# Patient Record
Sex: MALE | Race: WHITE | NOT HISPANIC OR LATINO | URBAN - METROPOLITAN AREA
[De-identification: names, ages, dates, MRNs, and addresses within clinical notes are randomized per-mention and may not be internally consistent; named-entity substitution may affect disease eponyms.]

---

## 2017-11-02 ENCOUNTER — OFFICE VISIT - HEALTHEAST (OUTPATIENT)
Dept: INTERNAL MEDICINE | Facility: CLINIC | Age: 52
End: 2017-11-02

## 2017-11-02 ENCOUNTER — COMMUNICATION - HEALTHEAST (OUTPATIENT)
Dept: TELEHEALTH | Facility: CLINIC | Age: 52
End: 2017-11-02

## 2017-11-02 DIAGNOSIS — F41.9 ANXIETY: ICD-10-CM

## 2017-11-02 DIAGNOSIS — Z12.5 PROSTATE CANCER SCREENING: ICD-10-CM

## 2017-11-02 DIAGNOSIS — Z00.00 ANNUAL PHYSICAL EXAM: ICD-10-CM

## 2017-11-02 DIAGNOSIS — Z13.220 LIPID SCREENING: ICD-10-CM

## 2017-11-02 DIAGNOSIS — I10 ESSENTIAL HYPERTENSION: ICD-10-CM

## 2017-11-02 DIAGNOSIS — Z12.11 COLON CANCER SCREENING: ICD-10-CM

## 2017-11-02 DIAGNOSIS — F32.A DEPRESSION: ICD-10-CM

## 2017-11-02 DIAGNOSIS — R53.83 FATIGUE: ICD-10-CM

## 2017-11-02 LAB
CHOLEST SERPL-MCNC: 285 MG/DL
FASTING STATUS PATIENT QL REPORTED: YES
HDLC SERPL-MCNC: 64 MG/DL
LDLC SERPL CALC-MCNC: 183 MG/DL
PSA SERPL-MCNC: 2.4 NG/ML (ref 0–3.5)
TRIGL SERPL-MCNC: 188 MG/DL

## 2017-11-02 ASSESSMENT — MIFFLIN-ST. JEOR: SCORE: 1491.92

## 2017-11-05 ENCOUNTER — COMMUNICATION - HEALTHEAST (OUTPATIENT)
Dept: INTERNAL MEDICINE | Facility: CLINIC | Age: 52
End: 2017-11-05

## 2017-11-14 ENCOUNTER — RECORDS - HEALTHEAST (OUTPATIENT)
Dept: ADMINISTRATIVE | Facility: OTHER | Age: 52
End: 2017-11-14

## 2017-11-19 ENCOUNTER — RECORDS - HEALTHEAST (OUTPATIENT)
Dept: ADMINISTRATIVE | Facility: OTHER | Age: 52
End: 2017-11-19

## 2019-04-15 ENCOUNTER — COMMUNICATION - HEALTHEAST (OUTPATIENT)
Dept: INTERNAL MEDICINE | Facility: CLINIC | Age: 54
End: 2019-04-15

## 2019-05-09 ENCOUNTER — COMMUNICATION - HEALTHEAST (OUTPATIENT)
Dept: INTERNAL MEDICINE | Facility: CLINIC | Age: 54
End: 2019-05-09

## 2019-05-16 ENCOUNTER — OFFICE VISIT - HEALTHEAST (OUTPATIENT)
Dept: INTERNAL MEDICINE | Facility: CLINIC | Age: 54
End: 2019-05-16

## 2019-05-16 DIAGNOSIS — F33.1 MODERATE EPISODE OF RECURRENT MAJOR DEPRESSIVE DISORDER (H): ICD-10-CM

## 2019-05-16 DIAGNOSIS — Z00.00 ANNUAL PHYSICAL EXAM: ICD-10-CM

## 2019-05-16 DIAGNOSIS — E78.5 HYPERLIPIDEMIA LDL GOAL <130: ICD-10-CM

## 2019-05-16 DIAGNOSIS — I10 ESSENTIAL HYPERTENSION: ICD-10-CM

## 2019-05-16 DIAGNOSIS — R79.89 ABNORMAL LFTS: ICD-10-CM

## 2019-05-16 DIAGNOSIS — F41.9 ANXIETY: ICD-10-CM

## 2019-05-16 DIAGNOSIS — Z12.5 SCREENING FOR PROSTATE CANCER: ICD-10-CM

## 2019-05-16 LAB
ALBUMIN SERPL-MCNC: 4.7 G/DL (ref 3.5–5)
ALP SERPL-CCNC: 80 U/L (ref 45–120)
ALT SERPL W P-5'-P-CCNC: 266 U/L (ref 0–45)
ANION GAP SERPL CALCULATED.3IONS-SCNC: 15 MMOL/L (ref 5–18)
AST SERPL W P-5'-P-CCNC: 241 U/L (ref 0–40)
BASOPHILS # BLD AUTO: 0.1 THOU/UL (ref 0–0.2)
BASOPHILS NFR BLD AUTO: 1 % (ref 0–2)
BILIRUB SERPL-MCNC: 1.7 MG/DL (ref 0–1)
BUN SERPL-MCNC: 13 MG/DL (ref 8–22)
CALCIUM SERPL-MCNC: 10.9 MG/DL (ref 8.5–10.5)
CHLORIDE BLD-SCNC: 100 MMOL/L (ref 98–107)
CO2 SERPL-SCNC: 24 MMOL/L (ref 22–31)
CREAT SERPL-MCNC: 1.01 MG/DL (ref 0.7–1.3)
EOSINOPHIL # BLD AUTO: 0.2 THOU/UL (ref 0–0.4)
EOSINOPHIL NFR BLD AUTO: 3 % (ref 0–6)
ERYTHROCYTE [DISTWIDTH] IN BLOOD BY AUTOMATED COUNT: 11.1 % (ref 11–14.5)
GFR SERPL CREATININE-BSD FRML MDRD: >60 ML/MIN/1.73M2
GLUCOSE BLD-MCNC: 85 MG/DL (ref 70–125)
HCT VFR BLD AUTO: 48.7 % (ref 40–54)
HGB BLD-MCNC: 16.8 G/DL (ref 14–18)
LDLC SERPL CALC-MCNC: 179 MG/DL
LYMPHOCYTES # BLD AUTO: 0.9 THOU/UL (ref 0.8–4.4)
LYMPHOCYTES NFR BLD AUTO: 14 % (ref 20–40)
MCH RBC QN AUTO: 34.8 PG (ref 27–34)
MCHC RBC AUTO-ENTMCNC: 34.4 G/DL (ref 32–36)
MCV RBC AUTO: 101 FL (ref 80–100)
MONOCYTES # BLD AUTO: 0.7 THOU/UL (ref 0–0.9)
MONOCYTES NFR BLD AUTO: 12 % (ref 2–10)
NEUTROPHILS # BLD AUTO: 4.1 THOU/UL (ref 2–7.7)
NEUTROPHILS NFR BLD AUTO: 70 % (ref 50–70)
PLATELET # BLD AUTO: 222 THOU/UL (ref 140–440)
PMV BLD AUTO: 7.8 FL (ref 7–10)
POTASSIUM BLD-SCNC: 4.8 MMOL/L (ref 3.5–5)
PROT SERPL-MCNC: 7.9 G/DL (ref 6–8)
PSA SERPL-MCNC: 3.1 NG/ML (ref 0–3.5)
RBC # BLD AUTO: 4.82 MILL/UL (ref 4.4–6.2)
SODIUM SERPL-SCNC: 139 MMOL/L (ref 136–145)
TSH SERPL DL<=0.005 MIU/L-ACNC: 2.24 UIU/ML (ref 0.3–5)
WBC: 5.9 THOU/UL (ref 4–11)

## 2019-05-16 ASSESSMENT — MIFFLIN-ST. JEOR: SCORE: 1492.37

## 2019-05-17 ENCOUNTER — COMMUNICATION - HEALTHEAST (OUTPATIENT)
Dept: INTERNAL MEDICINE | Facility: CLINIC | Age: 54
End: 2019-05-17

## 2019-05-17 DIAGNOSIS — R79.89 ELEVATED LFTS: ICD-10-CM

## 2019-05-17 LAB
HBV SURFACE AG SERPL QL IA: NEGATIVE
HCV AB SERPL QL IA: NEGATIVE

## 2019-07-10 ENCOUNTER — COMMUNICATION - HEALTHEAST (OUTPATIENT)
Dept: INTERNAL MEDICINE | Facility: CLINIC | Age: 54
End: 2019-07-10

## 2019-08-08 ENCOUNTER — COMMUNICATION - HEALTHEAST (OUTPATIENT)
Dept: INTERNAL MEDICINE | Facility: CLINIC | Age: 54
End: 2019-08-08

## 2019-08-19 ENCOUNTER — COMMUNICATION - HEALTHEAST (OUTPATIENT)
Dept: INTERNAL MEDICINE | Facility: CLINIC | Age: 54
End: 2019-08-19

## 2019-10-14 ENCOUNTER — COMMUNICATION - HEALTHEAST (OUTPATIENT)
Dept: INTERNAL MEDICINE | Facility: CLINIC | Age: 54
End: 2019-10-14

## 2019-10-14 DIAGNOSIS — F33.1 MODERATE EPISODE OF RECURRENT MAJOR DEPRESSIVE DISORDER (H): ICD-10-CM

## 2019-10-14 DIAGNOSIS — F41.9 ANXIETY: ICD-10-CM

## 2020-01-03 ENCOUNTER — COMMUNICATION - HEALTHEAST (OUTPATIENT)
Dept: INTERNAL MEDICINE | Facility: CLINIC | Age: 55
End: 2020-01-03

## 2020-01-03 DIAGNOSIS — F33.1 MODERATE EPISODE OF RECURRENT MAJOR DEPRESSIVE DISORDER (H): ICD-10-CM

## 2020-01-03 DIAGNOSIS — F41.9 ANXIETY: ICD-10-CM

## 2020-03-27 ENCOUNTER — COMMUNICATION - HEALTHEAST (OUTPATIENT)
Dept: INTERNAL MEDICINE | Facility: CLINIC | Age: 55
End: 2020-03-27

## 2021-05-28 NOTE — TELEPHONE ENCOUNTER
Please let pt know, I sent important My Chart message regarding his results.  If he can not get in his My Chart , please let him know:    Blood work is good except for liver function tests: they are much higher then last year. I'm afraid that alcohol is affecting your liver causing inflammation (alcoholic hepatitis).  Viral hepatitis screen for hep B and C is negative.    Please stop all alcohol ( if drinking more the 3 shots a day, taper gradualy down over the next week to prevent withdrowal) and have repeat blood work done in 1 month (I put order in). If it is still abnormal, we will need to image your liver.

## 2021-05-28 NOTE — PROGRESS NOTES
[facility] Follow Up Note    Assessment/Plan:    1. Annual physical exam  Patient is not fasting today but will do nonfasting blood work.  She does have history of serrated adenoma and will be due for repeat colonoscopy next year.  No change in bowel habits.  Will check PSA today.    2. Anxiety  Zoloft caused too much diarrhea.  Will try Lexapro.  If he does not like that we can move on to Prozac.  Currently I want to avoid Wellbutrin due to moderate alcohol use.  - escitalopram oxalate (LEXAPRO) 10 MG tablet; Take 1/2 tab by mouth daily for 1 week, then full tab daily  Dispense: 30 tablet; Refill: 4  - Ambulatory referral to Psychology  - [facility](CBC and Differential)  - Thyroid Stimulating Hormone (TSH)  - 1 (CBC with Diff)    3. Moderate episode of recurrent major depressive disorder (H)  See #2  - escitalopram oxalate (LEXAPRO) 10 MG tablet; Take 1/2 tab by mouth daily for 1 week, then full tab daily  Dispense: 30 tablet; Refill: 4  - Ambulatory referral to Psychology  - [facility](CBC and Differential)  - Thyroid Stimulating Hormone (TSH)  - [facility] (CBC with Diff)    4. Essential hypertension  Pressures on the high side, most likely directly related to his alcohol consumption and anxiety.  Will reassess it again in 6 weeks when he follows up.  If it still high would put him on antihypertensive.  - Comprehensive Metabolic Panel    5. Hyperlipidemia LDL goal <130  LDL was 183 2 years ago.  We will repeat it again today.  At that time I did not start him on cholesterol medication due to liver function elevation, most likely secondary to alcohol.  He is amiable to have hepatitis checked  - Comprehensive Metabolic Panel  - LDL Cholesterol, Direct  - Thyroid Stimulating Hormone (TSH)    6. Abnormal LFTs  - Hepatitis C Antibody (Anti-HCV)  - Hepatitis B Surface Antigen (HBsAG)    7. Screening for prostate cancer  - PSA (Prostatic-Specific Antigen), Annual Screen      Abby Arenas MD    Chief Complaint:  Chief  Complaint   Patient presents with     Annual Exam       History of Present Illness:    Dudley is a 53 y.o. male with history of anxiety, depression, hyperlipidemia and elevated blood pressure.  He is currently here for an annual physical exam.    I last saw him 2 years ago.  At that time we started him on Zoloft for depression and anxiety but it caused too much diarrhea and he stopped it.  He felt to follow-up at that time.  Currently he continues to have moderate depression and stress which partly situational: His son is in intermediate, he has IRS trouble.  He reports waking up anxious and cold sweats.  She does use alcohol later in the day to help calm down.  She usually drinks vodka.  He denies getting drunk on it but did not say exactly how many shots he drinks in the day.  Currently he is open to try different antidepressant and will try Lexapro.  If that does not work then we can do Prozac.  We discussed referral to psychology and he will check if insurance covers it.    She also continues to have elevated blood pressure and also was noted to have LDL of 183 in 2017.  At that time his liver function tests were also elevated most likely due to out of alcohol.  Discussed that cutting back on alcohol can improve blood pressure and liver function tests and one that normalizes I would put him on a cholesterol medication.  On follow-up of his anxiety is better control and blood pressure is still high I would consider putting him on a antihypertensive as well.    Review of Systems:  A comprehensive review of systems was performed and was otherwise negative    PFSH:  Social History: Viewed  Social History     Tobacco Use   Smoking Status Never Smoker   Smokeless Tobacco Former User     Social History     Social History Narrative     from him wife. Has 2 sons. Lives by himself in an apartment.       Past History: Reviewed  Current Outpatient Medications   Medication Sig Dispense Refill     escitalopram oxalate  "(LEXAPRO) 10 MG tablet Take 1/2 tab by mouth daily for 1 week, then full tab daily 30 tablet 4     No current facility-administered medications for this visit.        Family History: Reviewed    Physical Exam:    Vitals:    05/16/19 1218 05/16/19 1219   BP: 164/82 142/80   Patient Site: Left Arm Left Arm   Patient Position: Sitting Sitting   Cuff Size: Adult Regular Adult Regular   Pulse: 88    SpO2: 98%    Weight: 158 lb (71.7 kg)    Height: 5' 6.5\" (1.689 m)      Wt Readings from Last 3 Encounters:   05/16/19 158 lb (71.7 kg)   11/02/17 157 lb 14.4 oz (71.6 kg)     Body mass index is 25.12 kg/m .    Constitutional:  Reveals a pleasant male.  Vitals:  Per nursing notes.  HEENT:No cervical LAD, no thyromegaly,  conjunctiva is pink, no scleral icterus, TMs are visualized and normal bl, oropharynx is clear, no exudates,   Cardiac:  Regular rate and rhythm,no murmurs, rubs, or gallops. Carotids without bruits. Legs without edema. Palpation of the radial pulse regular.  Lungs: Clear to auscultation bl.  Respiratory effort normal.  Abdomen:positive BS, soft, nontender, nondistended.  No hepato-splenomagaly  Skin:   Without rash, bruise, or palpable lesions.  Rheumatologic: Normal joints and nails of the hands.  Neurologic:  Cranial nerves II-XII intact.     Psychiatric: affect somewhat down, memory intact.   Rectal exam: Small external hemorrhoids, prostate is smooth enlarged enlarged, nontender    Data Review:    Analysis and Summary of Old Records (2): yes      Records Requested (1): no      Other History Summarized (from other people in the room) (2): no    Radiology Tests Summarized (XRAY/CT/MRI/DXA) (1): no    Labs Reviewed (1): yes    Medicine Tests Reviewed (EKG/ECHO/COLONOSCOPY/EGD) (1): no    Independent Review of EKG or X-RAY (2): no      "

## 2021-05-28 NOTE — PATIENT INSTRUCTIONS - HE
1. Start Lexapro, let me know if do not like it    2. Cut back on alcohol    3. Consider seeing psychologist (referrla placed)    4. B/p and cholesterol were elevated. Please cut down on alcohol will re-check on follow up.

## 2021-05-31 VITALS — HEIGHT: 67 IN | BODY MASS INDEX: 24.78 KG/M2 | WEIGHT: 157.9 LBS

## 2021-06-02 NOTE — TELEPHONE ENCOUNTER
Refill Given    Refill given per Policy, patient informed they are overdue for Office Visit    Ruby Abad, [facility] Connection Triage/Med Refill 10/16/2019    Requested Prescriptions   Pending Prescriptions Disp Refills     escitalopram oxalate (LEXAPRO) 10 MG tablet [Pharmacy Med Name: ESCITALOPRAM 10MG TABLET** 10 TAB] 30 tablet 4     Sig: TAKE 1/2 TABLET BY MOUTH DAILY FOR 1 WEEK, THEN TAKE 1 TABLET BY MOUTH DAILY       [facility] Refill Protocol  Passed - 10/14/2019  4:42 PM        Passed - PCP or prescribing provider visit in last year     Last office visit with prescriber/PCP: Visit date not found OR same dept: Visit date not found OR same specialty: Visit date not found  Last physical: 5/16/2019 Last MTM visit: Visit date not found   Next visit within 3 mo: Visit date not found  Next physical within 3 mo: Visit date not found  Prescriber OR PCP: Abby Arenas MD  Last diagnosis associated with med order: 1. Anxiety  - escitalopram oxalate (LEXAPRO) 10 MG tablet [Pharmacy Med Name: ESCITALOPRAM 10MG TABLET** 10 TAB]; TAKE 1/2 TABLET BY MOUTH DAILY FOR 1 WEEK, THEN TAKE 1 TABLET BY MOUTH DAILY  Dispense: 30 tablet; Refill: 4    2. Moderate episode of recurrent major depressive disorder (H)  - escitalopram oxalate (LEXAPRO) 10 MG tablet [Pharmacy Med Name: ESCITALOPRAM 10MG TABLET** 10 TAB]; TAKE 1/2 TABLET BY MOUTH DAILY FOR 1 WEEK, THEN TAKE 1 TABLET BY MOUTH DAILY  Dispense: 30 tablet; Refill: 4    If protocol passes may refill for 12 months if within 3 months of last provider visit (or a total of 15 months).

## 2021-06-03 VITALS — BODY MASS INDEX: 24.8 KG/M2 | WEIGHT: 158 LBS | HEIGHT: 67 IN

## 2021-06-04 NOTE — TELEPHONE ENCOUNTER
RN cannot approve Refill Request    RN can NOT refill this medication Provider to review - note that visit requested before more refills. Last office visit: Visit date not found Last Physical: 5/16/2019 Last MTM visit: Visit date not found Last visit same specialty: Visit date not found.  Next visit within 3 mo: Visit date not found  Next physical within 3 mo: Visit date not found      Alexander IZZY Lyle, [facility] Triage/Med Refill 1/5/2020    Requested Prescriptions   Pending Prescriptions Disp Refills     escitalopram oxalate (LEXAPRO) 10 MG tablet [Pharmacy Med Name: ESCITALOPRAM 10 MG TABLET** 10 TAB] 30 tablet 0     Sig: TAKE 1/2 TABLET BY MOUTH DAILY FOR 1 WEEK, THEN TAKE 1 TABLET BY MOUTH DAILY. *DUE FOR 6 MONTH OFFICE VISIT BEFORE NEXT REFILL*       [facility] Refill Protocol  Passed - 1/3/2020  1:35 PM        Passed - PCP or prescribing provider visit in last year     Last office visit with prescriber/PCP: Visit date not found OR same dept: Visit date not found OR same specialty: Visit date not found  Last physical: 5/16/2019 Last MTM visit: Visit date not found   Next visit within 3 mo: Visit date not found  Next physical within 3 mo: Visit date not found  Prescriber OR PCP: Abby Arenas MD  Last diagnosis associated with med order: 1. Anxiety  - escitalopram oxalate (LEXAPRO) 10 MG tablet [Pharmacy Med Name: ESCITALOPRAM 10 MG TABLET** 10 TAB]; TAKE 1/2 TABLET BY MOUTH DAILY FOR 1 WEEK, THEN TAKE 1 TABLET BY MOUTH DAILY. *DUE FOR 6 MONTH OFFICE VISIT BEFORE NEXT REFILL*  Dispense: 30 tablet; Refill: 0    2. Moderate episode of recurrent major depressive disorder (H)  - escitalopram oxalate (LEXAPRO) 10 MG tablet [Pharmacy Med Name: ESCITALOPRAM 10 MG TABLET** 10 TAB]; TAKE 1/2 TABLET BY MOUTH DAILY FOR 1 WEEK, THEN TAKE 1 TABLET BY MOUTH DAILY. *DUE FOR 6 MONTH OFFICE VISIT BEFORE NEXT REFILL*  Dispense: 30 tablet; Refill: 0    If protocol passes may refill for 12 months if within 3 months of last  provider visit (or a total of 15 months).

## 2021-06-12 ENCOUNTER — HEALTH MAINTENANCE LETTER (OUTPATIENT)
Age: 56
End: 2021-06-12

## 2021-06-13 NOTE — PROGRESS NOTES
[facility] Follow Up Note    Assessment/Plan:  1. Annual physical exam  Patient denied STD screening today.  Colonoscopy was ordered.  He will have fasting lipid screen done.  - Lipid Grand  - Ambulatory referral for Colonoscopy  - Lipid Cascade  - Comprehensive Metabolic Panel  - [facility]1(CBC and Differential)  - Thyroid Stimulating Hormone (TSH)  - HM1 (CBC with Diff)  - PSA (Prostatic-Specific Antigen), Annual Screen    2. Anxiety and depression  Symptoms are situational plus he always had problems with mild chronic anxiety.  I believe he will benefit from personal psychotherapy if he can afford it.  We will start him on Zoloft.  Side effects were discussed.  - Ambulatory referral to Psychology  - sertraline (ZOLOFT) 50 MG tablet; Take 1/2 tab by mouth daily for 1 week, then 1 tab daily  Dispense: 30 tablet; Refill: 4  - Thyroid Stimulating Hormone (TSH)    3. Essential hypertension  Blood pressure is moderately elevated but patient does have a lot of anxiety.  Will start by treating his anxiety.  She will monitor blood pressure at home and will bring readings to his next appointment here  - Comprehensive Metabolic Panel      Abby Arenas MD    Chief Complaint:  Chief Complaint   Patient presents with     Annual Exam     Establish Care     Hypertension     Depression       History of Present Illness:  Dudley is a 52 y.o. male with history of anxiety, elevated blood pressure who is currently here to establish care and have physical exam done.  She has not had medical care in the last 8 years.    His biggest concern today is worsening anxiety.  His symptoms are situational: He is recently  from his wife (he mentioned that he was not faithful), he got laid off in July and has not been able to find a job, and his cat recently .  Patient reports generalized anxiety and occasional panic attacks due to inability to find a job.  His jun 7 score is 12 and PHQ 9 score is 13.  She does reports  episodes of apathy and decreased motivation at times but believes that his anxiety symptoms are worse.  Patient has had problems with alcohol overuse in the past.  Currently he realizes that he has dysfunctional relationship with alcohol and is trying to cut down.  Currently he drinks 4 alcoholic beverages a day and has a goal of cutting back to 2 alcoholic beverages in a day.  He denies any sleep disturbance.  Patient is adopted so we do not have any psychiatric past medical history to review.    Patient was told that he has blood pressure elevations in the past.  He believes that he was prescribed blood pressure medication but never took it.  He has blood pressure cuff at home but has not been using it.  In the office today blood pressure is moderately elevated but patient does admit to being anxious.  We discussed that we will start with treatment of his anxiety and he will monitor his blood pressure at home.    Review of Systems:  A comprehensive review of systems was performed and was otherwise negative.  Patient is physically active and works out regularly, he does weight lifting, walking.  He denies any chest pains palpitations dizziness.  No shortness of breath or cough.  Bowels are regular.  No urinary symptoms.  He refused STD screen today.    PFSH:  Social History: Reviewed  History   Smoking Status     Never Smoker   Smokeless Tobacco     Former User     Social History     Social History Narrative     from him wife. Has 2 sons. Lives by himself in an apartment.       Past History: Reviewed  Current Outpatient Prescriptions   Medication Sig Dispense Refill     sertraline (ZOLOFT) 50 MG tablet Take 1/2 tab by mouth daily for 1 week, then 1 tab daily 30 tablet 4     No current facility-administered medications for this visit.        Family History: Patient is adopted    Physical Exam:    Vitals:    11/02/17 1227 11/02/17 1229   BP: 162/90 (!) 158/92   Patient Site: Left Arm Left Arm   Patient  "Position: Sitting Sitting   Cuff Size: Adult Regular Adult Regular   Pulse: 75    SpO2: 100%    Weight: 157 lb 14.4 oz (71.6 kg)    Height: 5' 6.5\" (1.689 m)      Wt Readings from Last 3 Encounters:   11/02/17 157 lb 14.4 oz (71.6 kg)     Body mass index is 25.1 kg/(m^2).    Constitutional:  Reveals a pleasant male.  Vitals:  Per nursing notes.  HEENT:No cervical LAD, no thyromegaly,  conjunctiva is pink, no scleral icterus, TMs are visualized and normal bl, oropharynx is clear, no exudates,   Cardiac:  Regular rate and rhythm,no murmurs, rubs, or gallops. Legs without edema. Palpation of the radial pulse regular.  Lungs: Clear to auscultation bl.  Respiratory effort normal.  Abdomen:positive BS, soft, nontender, nondistended.  No hepato-splenomagaly  Skin: Mild dermatitis on his back, patient has used topical steroids in the past  Rheumatologic: Normal joints and nails of the hands.  Neurologic:  Cranial nerves II-XII intact.     Psychiatric: affect appropriate, moderate anxiety noted, no flight of ideas or pressured speech, good eye contact, so processes linear, memory intact.     Data Review:    Analysis and Summary of Old Records (2): Yes    Records Requested (1): No    Other History Summarized (from other people in the room) (2): No    Radiology Tests Summarized (XRAY/CT/MRI/DXA) (1) no    Labs Reviewed (1): Yes    Medicine Tests Reviewed (EKG/ECHO/COLONOSCOPY/EGD) (1): No    Independent Review of EKG or X-RAY (2): No      "

## 2021-06-19 NOTE — LETTER
Letter by Abby Arenas MD at      Author: Abby Arenas MD Service: -- Author Type: --    Filed:  Encounter Date: 8/19/2019 Status: (Other)       Dudley Lugo  [address] [place] [address]    August 19, 2019    Dear Dudley Arenas is wanting to know how your emotional/ mental health has been. Please fill out the questions below and send it back to the clinic. Upon receiving the answers we will send the results to Dr. Arenas and if any changes/ follow up is needed we will notify you. Thank you for your time.     Over the last 2 weeks, how often have you been bothered by any of the following problems?       1. Little interest or pleasure in doing things     Not at all   Several days   More than half of days   Nearly every day     2. Feeling down, depressed, or hopeless     Not at all   Several days   More than half of days   Nearly every day     3. Trouble falling asleep, staying asleep, or sleeping too much     Not at all   Several days   More than half of days   Nearly every day     4. Feeling tired or having little energy     Not at all   Several days   More than half of days   Nearly every day     5. Poor appetite or overeating     Not at all   Several days   More than half of days   Nearly every day     6. Feeling bad about yourself, or that you are a failure or let yourself or your family down     Not at all   Several days   More than half of days   Nearly every day     7. Trouble concentrating on things, such as reading the newspaper or watching television     Not at all   Several days   More than half of days   Nearly every day     8. Moving or speaking so slowly that other people have noticed? Or the opposite, being so fidgety or restless that you have been moving around a lot more than usual     Not at all   Several days   More than half of days   Nearly every day     9. Thoughts that you would be better off dead or hurting yourself in some way     Not at all   Several  days   More than half of days   Nearly every day       If you checked off any problems, how difficult have these problems made it for you to do work, take care of things at home, or get along with other people?     Not difficult at all   Somewhat difficult   Very difficult   Extremely difficult         To complete the form you can either copy and paste the previous message into the reply and answer the questions, print it out and mail it to us/ drop it off, or you can call us at [phone] and complete it over the phone.      Please let us know how we can be of further assistance. Thank you for choosing us as your health care provider.      Sincerely,   [facility]  [address] [id number]  Phone Number:  [phone]

## 2021-10-02 ENCOUNTER — HEALTH MAINTENANCE LETTER (OUTPATIENT)
Age: 56
End: 2021-10-02

## 2022-05-12 ENCOUNTER — TRANSFERRED RECORDS (OUTPATIENT)
Dept: HEALTH INFORMATION MANAGEMENT | Facility: CLINIC | Age: 57
End: 2022-05-12
Payer: COMMERCIAL

## 2022-07-03 ENCOUNTER — HEALTH MAINTENANCE LETTER (OUTPATIENT)
Age: 57
End: 2022-07-03

## 2022-08-05 ENCOUNTER — TRANSFERRED RECORDS (OUTPATIENT)
Dept: HEALTH INFORMATION MANAGEMENT | Facility: CLINIC | Age: 57
End: 2022-08-05

## 2022-08-05 LAB
ALT SERPL-CCNC: 28 IU/L (ref 0–44)
AST SERPL-CCNC: 57 IU/L (ref 0–40)
CREATININE (EXTERNAL): 0.82 MG/DL (ref 0.76–1.27)
GFR ESTIMATED (EXTERNAL): 101 ML/MIN/1.73
GLUCOSE (EXTERNAL): 97 MG/DL (ref 65–99)
HEP C HIM: NORMAL
INR (EXTERNAL): 1.4 (ref 0.9–1.2)
POTASSIUM (EXTERNAL): 4.1 MMOL/L (ref 3.5–5.2)

## 2022-09-16 ENCOUNTER — TRANSFERRED RECORDS (OUTPATIENT)
Dept: HEALTH INFORMATION MANAGEMENT | Facility: CLINIC | Age: 57
End: 2022-09-16

## 2022-10-07 ENCOUNTER — TRANSFERRED RECORDS (OUTPATIENT)
Dept: HEALTH INFORMATION MANAGEMENT | Facility: CLINIC | Age: 57
End: 2022-10-07

## 2023-01-14 ENCOUNTER — HEALTH MAINTENANCE LETTER (OUTPATIENT)
Age: 58
End: 2023-01-14

## 2023-07-22 ENCOUNTER — HEALTH MAINTENANCE LETTER (OUTPATIENT)
Age: 58
End: 2023-07-22